# Patient Record
Sex: MALE | URBAN - METROPOLITAN AREA
[De-identification: names, ages, dates, MRNs, and addresses within clinical notes are randomized per-mention and may not be internally consistent; named-entity substitution may affect disease eponyms.]

---

## 2019-08-05 ENCOUNTER — APPOINTMENT (RX ONLY)
Dept: URBAN - METROPOLITAN AREA OTHER 13 | Facility: OTHER | Age: 79
Setting detail: DERMATOLOGY
End: 2019-08-05

## 2019-08-05 DIAGNOSIS — L57.0 ACTINIC KERATOSIS: ICD-10-CM

## 2019-08-05 DIAGNOSIS — D18.0 HEMANGIOMA: ICD-10-CM

## 2019-08-05 DIAGNOSIS — L98429 CHRONIC ULCER OF OTHER SPECIFIED SITES: ICD-10-CM

## 2019-08-05 DIAGNOSIS — L82.1 OTHER SEBORRHEIC KERATOSIS: ICD-10-CM

## 2019-08-05 DIAGNOSIS — D69.2 OTHER NONTHROMBOCYTOPENIC PURPURA: ICD-10-CM

## 2019-08-05 DIAGNOSIS — L98419 CHRONIC ULCER OF OTHER SPECIFIED SITES: ICD-10-CM

## 2019-08-05 DIAGNOSIS — Z71.89 OTHER SPECIFIED COUNSELING: ICD-10-CM

## 2019-08-05 DIAGNOSIS — L20.89 OTHER ATOPIC DERMATITIS: ICD-10-CM

## 2019-08-05 PROBLEM — D18.01 HEMANGIOMA OF SKIN AND SUBCUTANEOUS TISSUE: Status: ACTIVE | Noted: 2019-08-05

## 2019-08-05 PROBLEM — L98.419 NON-PRESSURE CHRONIC ULCER OF BUTTOCK WITH UNSPECIFIED SEVERITY: Status: ACTIVE | Noted: 2019-08-05

## 2019-08-05 PROCEDURE — ? RECOMMENDATIONS

## 2019-08-05 PROCEDURE — ? TREATMENT REGIMEN

## 2019-08-05 PROCEDURE — ? PRESCRIPTION

## 2019-08-05 PROCEDURE — 99203 OFFICE O/P NEW LOW 30 MIN: CPT | Mod: 25

## 2019-08-05 PROCEDURE — ? SUNSCREEN RECOMMENDATIONS

## 2019-08-05 PROCEDURE — 17000 DESTRUCT PREMALG LESION: CPT

## 2019-08-05 PROCEDURE — ? COUNSELING

## 2019-08-05 PROCEDURE — ? LIQUID NITROGEN

## 2019-08-05 RX ORDER — TRIAMCINOLONE ACETONIDE 1 MG/G
CREAM TOPICAL
Qty: 1 | Refills: 1 | Status: ERX | COMMUNITY
Start: 2019-08-05

## 2019-08-05 RX ADMIN — TRIAMCINOLONE ACETONIDE: 1 CREAM TOPICAL at 00:00

## 2019-08-05 ASSESSMENT — LOCATION DETAILED DESCRIPTION DERM
LOCATION DETAILED: RIGHT INFERIOR FOREHEAD
LOCATION DETAILED: EPIGASTRIC SKIN
LOCATION DETAILED: RIGHT INFERIOR MEDIAL UPPER BACK
LOCATION DETAILED: GLUTEAL CLEFT
LOCATION DETAILED: RIGHT PROXIMAL PRETIBIAL REGION
LOCATION DETAILED: LEFT DISTAL DORSAL FOREARM
LOCATION DETAILED: RIGHT MEDIAL UPPER BACK
LOCATION DETAILED: LEFT MEDIAL MALAR CHEEK
LOCATION DETAILED: LEFT PROXIMAL PRETIBIAL REGION
LOCATION DETAILED: LEFT DISTAL PRETIBIAL REGION
LOCATION DETAILED: RIGHT LATERAL ZYGOMA
LOCATION DETAILED: RIGHT DISTAL RADIAL DORSAL FOREARM
LOCATION DETAILED: LEFT FOREHEAD

## 2019-08-05 ASSESSMENT — LOCATION SIMPLE DESCRIPTION DERM
LOCATION SIMPLE: GLUTEAL CLEFT
LOCATION SIMPLE: LEFT PRETIBIAL REGION
LOCATION SIMPLE: RIGHT UPPER BACK
LOCATION SIMPLE: RIGHT FOREHEAD
LOCATION SIMPLE: RIGHT PRETIBIAL REGION
LOCATION SIMPLE: RIGHT ZYGOMA
LOCATION SIMPLE: LEFT FOREARM
LOCATION SIMPLE: LEFT CHEEK
LOCATION SIMPLE: LEFT FOREHEAD
LOCATION SIMPLE: RIGHT FOREARM
LOCATION SIMPLE: ABDOMEN

## 2019-08-05 ASSESSMENT — LOCATION ZONE DERM
LOCATION ZONE: FACE
LOCATION ZONE: TRUNK
LOCATION ZONE: LEG
LOCATION ZONE: ARM

## 2019-08-05 NOTE — HPI: RASH
What Type Of Note Output Would You Prefer (Optional)?: Standard Output
How Severe Is Your Rash?: mild
Is This A New Presentation, Or A Follow-Up?: Rash
How Severe Is Your Rash?: moderate
Additional History: Pilonidol cyst removed when patient was 26 years old

## 2019-08-05 NOTE — PROCEDURE: TREATMENT REGIMEN
Samples Given: Bensal HP to affected area on buttocks twice a day
Detail Level: Simple
Initiate Treatment: Triamcinolone to affected area once a day X2-3 days, repeat PRN

## 2019-08-05 NOTE — PROCEDURE: LIQUID NITROGEN
Duration Of Freeze Thaw-Cycle (Seconds): 5
Number Of Freeze-Thaw Cycles: 1 freeze-thaw cycle
Render Note In Bullet Format When Appropriate: No
Detail Level: Simple

## 2019-09-18 ENCOUNTER — APPOINTMENT (RX ONLY)
Dept: URBAN - METROPOLITAN AREA OTHER 13 | Facility: OTHER | Age: 79
Setting detail: DERMATOLOGY
End: 2019-09-18

## 2019-09-18 DIAGNOSIS — L98429 CHRONIC ULCER OF OTHER SPECIFIED SITES: ICD-10-CM

## 2019-09-18 DIAGNOSIS — L98419 CHRONIC ULCER OF OTHER SPECIFIED SITES: ICD-10-CM

## 2019-09-18 PROBLEM — L98.419 NON-PRESSURE CHRONIC ULCER OF BUTTOCK WITH UNSPECIFIED SEVERITY: Status: ACTIVE | Noted: 2019-09-18

## 2019-09-18 PROCEDURE — 99212 OFFICE O/P EST SF 10 MIN: CPT

## 2019-09-18 PROCEDURE — ? COUNSELING

## 2019-09-18 PROCEDURE — ? TREATMENT REGIMEN

## 2019-09-18 ASSESSMENT — LOCATION DETAILED DESCRIPTION DERM: LOCATION DETAILED: GLUTEAL CLEFT

## 2019-09-18 ASSESSMENT — LOCATION SIMPLE DESCRIPTION DERM: LOCATION SIMPLE: GLUTEAL CLEFT

## 2019-09-18 ASSESSMENT — LOCATION ZONE DERM: LOCATION ZONE: TRUNK

## 2019-09-18 NOTE — PROCEDURE: TREATMENT REGIMEN
Detail Level: Simple
Modify Regimen: Triamcinolone once a day X3 weeks, then discontinue
Samples Given: Bensal HP to affected area once a day

## 2019-10-30 ENCOUNTER — APPOINTMENT (RX ONLY)
Dept: URBAN - METROPOLITAN AREA OTHER 13 | Facility: OTHER | Age: 79
Setting detail: DERMATOLOGY
End: 2019-10-30

## 2019-10-30 DIAGNOSIS — D69.2 OTHER NONTHROMBOCYTOPENIC PURPURA: ICD-10-CM

## 2019-10-30 DIAGNOSIS — Z71.89 OTHER SPECIFIED COUNSELING: ICD-10-CM

## 2019-10-30 DIAGNOSIS — L98429 CHRONIC ULCER OF OTHER SPECIFIED SITES: ICD-10-CM | Status: STABLE

## 2019-10-30 DIAGNOSIS — L98419 CHRONIC ULCER OF OTHER SPECIFIED SITES: ICD-10-CM | Status: STABLE

## 2019-10-30 PROBLEM — L98.429 NON-PRESSURE CHRONIC ULCER OF BACK WITH UNSPECIFIED SEVERITY: Status: ACTIVE | Noted: 2019-10-30

## 2019-10-30 PROCEDURE — 99213 OFFICE O/P EST LOW 20 MIN: CPT

## 2019-10-30 PROCEDURE — ? TREATMENT REGIMEN

## 2019-10-30 PROCEDURE — ? COUNSELING

## 2019-10-30 ASSESSMENT — LOCATION DETAILED DESCRIPTION DERM
LOCATION DETAILED: LEFT DISTAL DORSAL FOREARM
LOCATION DETAILED: RIGHT DISTAL DORSAL FOREARM
LOCATION DETAILED: INFERIOR LUMBAR SPINE

## 2019-10-30 ASSESSMENT — LOCATION SIMPLE DESCRIPTION DERM
LOCATION SIMPLE: RIGHT FOREARM
LOCATION SIMPLE: LOWER BACK
LOCATION SIMPLE: LEFT FOREARM

## 2019-10-30 ASSESSMENT — LOCATION ZONE DERM
LOCATION ZONE: ARM
LOCATION ZONE: TRUNK

## 2019-10-30 NOTE — PROCEDURE: TREATMENT REGIMEN
Initiate Treatment: Triple Paste and get a donut pillow to sit on
Detail Level: Simple
Initiate Treatment: Dermend

## 2019-12-02 ENCOUNTER — APPOINTMENT (RX ONLY)
Dept: URBAN - METROPOLITAN AREA OTHER 13 | Facility: OTHER | Age: 79
Setting detail: DERMATOLOGY
End: 2019-12-02

## 2019-12-02 DIAGNOSIS — D69.2 OTHER NONTHROMBOCYTOPENIC PURPURA: ICD-10-CM

## 2019-12-02 DIAGNOSIS — L98429 CHRONIC ULCER OF OTHER SPECIFIED SITES: ICD-10-CM | Status: UNCHANGED

## 2019-12-02 DIAGNOSIS — Z71.89 OTHER SPECIFIED COUNSELING: ICD-10-CM

## 2019-12-02 DIAGNOSIS — L98419 CHRONIC ULCER OF OTHER SPECIFIED SITES: ICD-10-CM | Status: UNCHANGED

## 2019-12-02 DIAGNOSIS — L20.89 OTHER ATOPIC DERMATITIS: ICD-10-CM

## 2019-12-02 PROBLEM — L98.429 NON-PRESSURE CHRONIC ULCER OF BACK WITH UNSPECIFIED SEVERITY: Status: ACTIVE | Noted: 2019-12-02

## 2019-12-02 PROBLEM — L20.84 INTRINSIC (ALLERGIC) ECZEMA: Status: ACTIVE | Noted: 2019-12-02

## 2019-12-02 PROCEDURE — ? COUNSELING

## 2019-12-02 PROCEDURE — ? TREATMENT REGIMEN

## 2019-12-02 PROCEDURE — ? PRESCRIPTION

## 2019-12-02 PROCEDURE — 99213 OFFICE O/P EST LOW 20 MIN: CPT

## 2019-12-02 RX ORDER — MUPIROCIN 20 MG/G
OINTMENT TOPICAL
Qty: 1 | Refills: 2 | Status: ERX | COMMUNITY
Start: 2019-12-02

## 2019-12-02 RX ADMIN — MUPIROCIN: 20 OINTMENT TOPICAL at 00:00

## 2019-12-02 ASSESSMENT — LOCATION ZONE DERM
LOCATION ZONE: ARM
LOCATION ZONE: TRUNK

## 2019-12-02 ASSESSMENT — LOCATION SIMPLE DESCRIPTION DERM
LOCATION SIMPLE: LOWER BACK
LOCATION SIMPLE: RIGHT FOREARM
LOCATION SIMPLE: RIGHT UPPER ARM
LOCATION SIMPLE: LEFT FOREARM

## 2019-12-02 ASSESSMENT — LOCATION DETAILED DESCRIPTION DERM
LOCATION DETAILED: RIGHT ANTERIOR DISTAL UPPER ARM
LOCATION DETAILED: INFERIOR LUMBAR SPINE
LOCATION DETAILED: LEFT DISTAL DORSAL FOREARM
LOCATION DETAILED: RIGHT DISTAL DORSAL FOREARM

## 2019-12-02 NOTE — PROCEDURE: TREATMENT REGIMEN
Initiate Treatment: Triamcinolone, Bensal HP, and Mupirocin Ointment
Detail Level: Simple
Initiate Treatment: Triamcinolone
Initiate Treatment: Dermend

## 2020-04-23 RX ORDER — TRIAMCINOLONE ACETONIDE 1 MG/G
CREAM TOPICAL
Qty: 1 | Refills: 1 | Status: ERX

## 2020-05-18 ENCOUNTER — APPOINTMENT (RX ONLY)
Dept: URBAN - METROPOLITAN AREA OTHER 13 | Facility: OTHER | Age: 80
Setting detail: DERMATOLOGY
End: 2020-05-18

## 2020-05-18 DIAGNOSIS — L20.89 OTHER ATOPIC DERMATITIS: ICD-10-CM | Status: STABLE

## 2020-05-18 DIAGNOSIS — L82.1 OTHER SEBORRHEIC KERATOSIS: ICD-10-CM

## 2020-05-18 DIAGNOSIS — L98419 CHRONIC ULCER OF OTHER SPECIFIED SITES: ICD-10-CM | Status: WELL CONTROLLED

## 2020-05-18 DIAGNOSIS — D18.0 HEMANGIOMA: ICD-10-CM

## 2020-05-18 DIAGNOSIS — Z71.89 OTHER SPECIFIED COUNSELING: ICD-10-CM

## 2020-05-18 DIAGNOSIS — L81.4 OTHER MELANIN HYPERPIGMENTATION: ICD-10-CM

## 2020-05-18 DIAGNOSIS — D69.2 OTHER NONTHROMBOCYTOPENIC PURPURA: ICD-10-CM

## 2020-05-18 DIAGNOSIS — L57.0 ACTINIC KERATOSIS: ICD-10-CM

## 2020-05-18 DIAGNOSIS — L98429 CHRONIC ULCER OF OTHER SPECIFIED SITES: ICD-10-CM | Status: WELL CONTROLLED

## 2020-05-18 PROBLEM — L20.84 INTRINSIC (ALLERGIC) ECZEMA: Status: ACTIVE | Noted: 2020-05-18

## 2020-05-18 PROBLEM — L98.429 NON-PRESSURE CHRONIC ULCER OF BACK WITH UNSPECIFIED SEVERITY: Status: ACTIVE | Noted: 2020-05-18

## 2020-05-18 PROBLEM — D18.01 HEMANGIOMA OF SKIN AND SUBCUTANEOUS TISSUE: Status: ACTIVE | Noted: 2020-05-18

## 2020-05-18 PROCEDURE — ? SUNSCREEN RECOMMENDATIONS

## 2020-05-18 PROCEDURE — 17003 DESTRUCT PREMALG LES 2-14: CPT

## 2020-05-18 PROCEDURE — ? COUNSELING

## 2020-05-18 PROCEDURE — ? TREATMENT REGIMEN

## 2020-05-18 PROCEDURE — ? LIQUID NITROGEN

## 2020-05-18 PROCEDURE — 17000 DESTRUCT PREMALG LESION: CPT

## 2020-05-18 PROCEDURE — 99214 OFFICE O/P EST MOD 30 MIN: CPT | Mod: 25

## 2020-05-18 ASSESSMENT — LOCATION ZONE DERM
LOCATION ZONE: TRUNK
LOCATION ZONE: ARM
LOCATION ZONE: FACE
LOCATION ZONE: LEG
LOCATION ZONE: HAND
LOCATION ZONE: LIP

## 2020-05-18 ASSESSMENT — LOCATION SIMPLE DESCRIPTION DERM
LOCATION SIMPLE: LEFT FOREARM
LOCATION SIMPLE: LEFT FOREHEAD
LOCATION SIMPLE: LOWER BACK
LOCATION SIMPLE: RIGHT UPPER BACK
LOCATION SIMPLE: RIGHT HAND
LOCATION SIMPLE: RIGHT UPPER ARM
LOCATION SIMPLE: RIGHT FOREHEAD
LOCATION SIMPLE: RIGHT FOREARM
LOCATION SIMPLE: LEFT HAND
LOCATION SIMPLE: LEFT ZYGOMA
LOCATION SIMPLE: LEFT THIGH
LOCATION SIMPLE: ABDOMEN
LOCATION SIMPLE: LEFT PRETIBIAL REGION
LOCATION SIMPLE: RIGHT PRETIBIAL REGION
LOCATION SIMPLE: UPPER LIP

## 2020-05-18 ASSESSMENT — LOCATION DETAILED DESCRIPTION DERM
LOCATION DETAILED: RIGHT DISTAL DORSAL FOREARM
LOCATION DETAILED: INFERIOR LUMBAR SPINE
LOCATION DETAILED: LEFT ANTERIOR LATERAL PROXIMAL THIGH
LOCATION DETAILED: RIGHT RADIAL DORSAL HAND
LOCATION DETAILED: LEFT ULNAR DORSAL HAND
LOCATION DETAILED: LEFT FOREHEAD
LOCATION DETAILED: LEFT MEDIAL ZYGOMA
LOCATION DETAILED: RIGHT PROXIMAL PRETIBIAL REGION
LOCATION DETAILED: LEFT PROXIMAL PRETIBIAL REGION
LOCATION DETAILED: RIGHT DISTAL RADIAL DORSAL FOREARM
LOCATION DETAILED: PHILTRUM
LOCATION DETAILED: RIGHT ANTERIOR DISTAL UPPER ARM
LOCATION DETAILED: RIGHT INFERIOR MEDIAL UPPER BACK
LOCATION DETAILED: RIGHT MEDIAL UPPER BACK
LOCATION DETAILED: LEFT DISTAL DORSAL FOREARM
LOCATION DETAILED: RIGHT INFERIOR FOREHEAD
LOCATION DETAILED: EPIGASTRIC SKIN

## 2020-07-27 ENCOUNTER — APPOINTMENT (RX ONLY)
Dept: URBAN - METROPOLITAN AREA OTHER 13 | Facility: OTHER | Age: 80
Setting detail: DERMATOLOGY
End: 2020-07-27

## 2020-07-27 DIAGNOSIS — L82.1 OTHER SEBORRHEIC KERATOSIS: ICD-10-CM

## 2020-07-27 DIAGNOSIS — D69.2 OTHER NONTHROMBOCYTOPENIC PURPURA: ICD-10-CM

## 2020-07-27 DIAGNOSIS — L21.8 OTHER SEBORRHEIC DERMATITIS: ICD-10-CM | Status: INADEQUATELY CONTROLLED

## 2020-07-27 DIAGNOSIS — L81.4 OTHER MELANIN HYPERPIGMENTATION: ICD-10-CM

## 2020-07-27 DIAGNOSIS — L72.8 OTHER FOLLICULAR CYSTS OF THE SKIN AND SUBCUTANEOUS TISSUE: ICD-10-CM

## 2020-07-27 DIAGNOSIS — L57.0 ACTINIC KERATOSIS: ICD-10-CM

## 2020-07-27 PROCEDURE — ? TREATMENT REGIMEN

## 2020-07-27 PROCEDURE — ? LIQUID NITROGEN

## 2020-07-27 PROCEDURE — 17000 DESTRUCT PREMALG LESION: CPT

## 2020-07-27 PROCEDURE — ? COUNSELING

## 2020-07-27 PROCEDURE — 17003 DESTRUCT PREMALG LES 2-14: CPT

## 2020-07-27 PROCEDURE — ? PRESCRIPTION

## 2020-07-27 PROCEDURE — 99214 OFFICE O/P EST MOD 30 MIN: CPT | Mod: 25

## 2020-07-27 RX ORDER — CLOBETASOL PROPIONATE 0.5 MG/ML
SOLUTION TOPICAL
Qty: 1 | Refills: 1 | Status: ERX | COMMUNITY
Start: 2020-07-27

## 2020-07-27 RX ORDER — KETOCONAZOLE 20 MG/ML
SHAMPOO, SUSPENSION TOPICAL BIW
Qty: 1 | Refills: 6 | Status: ERX | COMMUNITY
Start: 2020-07-27

## 2020-07-27 RX ADMIN — CLOBETASOL PROPIONATE: 0.5 SOLUTION TOPICAL at 00:00

## 2020-07-27 RX ADMIN — KETOCONAZOLE: 20 SHAMPOO, SUSPENSION TOPICAL at 00:00

## 2020-07-27 ASSESSMENT — LOCATION DETAILED DESCRIPTION DERM
LOCATION DETAILED: RIGHT INFERIOR LATERAL FOREHEAD
LOCATION DETAILED: LEFT MEDIAL FRONTAL SCALP
LOCATION DETAILED: RIGHT PROXIMAL POSTERIOR UPPER ARM
LOCATION DETAILED: LEFT UPPER CUTANEOUS LIP
LOCATION DETAILED: LEFT ANTERIOR PROXIMAL THIGH
LOCATION DETAILED: RIGHT CENTRAL FRONTAL SCALP
LOCATION DETAILED: RIGHT SUPERIOR FOREHEAD
LOCATION DETAILED: LEFT CENTRAL FRONTAL SCALP
LOCATION DETAILED: RIGHT DISTAL DORSAL FOREARM
LOCATION DETAILED: LEFT DISTAL DORSAL FOREARM

## 2020-07-27 ASSESSMENT — LOCATION SIMPLE DESCRIPTION DERM
LOCATION SIMPLE: LEFT FOREARM
LOCATION SIMPLE: LEFT LIP
LOCATION SIMPLE: RIGHT UPPER ARM
LOCATION SIMPLE: LEFT SCALP
LOCATION SIMPLE: RIGHT FOREHEAD
LOCATION SIMPLE: RIGHT FOREARM
LOCATION SIMPLE: SCALP
LOCATION SIMPLE: LEFT THIGH

## 2020-07-27 ASSESSMENT — LOCATION ZONE DERM
LOCATION ZONE: SCALP
LOCATION ZONE: FACE
LOCATION ZONE: LEG
LOCATION ZONE: ARM
LOCATION ZONE: LIP

## 2020-07-27 NOTE — PROCEDURE: LIQUID NITROGEN
Number Of Freeze-Thaw Cycles: 1 freeze-thaw cycle
Duration Of Freeze Thaw-Cycle (Seconds): 5
Detail Level: Simple
Render Note In Bullet Format When Appropriate: No

## 2021-05-17 ENCOUNTER — APPOINTMENT (RX ONLY)
Dept: URBAN - METROPOLITAN AREA OTHER 13 | Facility: OTHER | Age: 81
Setting detail: DERMATOLOGY
End: 2021-05-17

## 2021-05-17 DIAGNOSIS — L82.1 OTHER SEBORRHEIC KERATOSIS: ICD-10-CM

## 2021-05-17 DIAGNOSIS — L81.4 OTHER MELANIN HYPERPIGMENTATION: ICD-10-CM

## 2021-05-17 DIAGNOSIS — D69.2 OTHER NONTHROMBOCYTOPENIC PURPURA: ICD-10-CM

## 2021-05-17 DIAGNOSIS — L21.8 OTHER SEBORRHEIC DERMATITIS: ICD-10-CM

## 2021-05-17 DIAGNOSIS — D18.0 HEMANGIOMA: ICD-10-CM

## 2021-05-17 DIAGNOSIS — Z71.89 OTHER SPECIFIED COUNSELING: ICD-10-CM

## 2021-05-17 PROBLEM — D18.01 HEMANGIOMA OF SKIN AND SUBCUTANEOUS TISSUE: Status: ACTIVE | Noted: 2021-05-17

## 2021-05-17 PROCEDURE — 99213 OFFICE O/P EST LOW 20 MIN: CPT

## 2021-05-17 PROCEDURE — ? COUNSELING

## 2021-05-17 PROCEDURE — ? TREATMENT REGIMEN

## 2021-05-17 PROCEDURE — ? PRESCRIPTION

## 2021-05-17 RX ORDER — KETOCONAZOLE 20 MG/G
CREAM TOPICAL
Qty: 1 | Refills: 1 | Status: ERX | COMMUNITY
Start: 2021-05-17

## 2021-05-17 RX ADMIN — KETOCONAZOLE: 20 CREAM TOPICAL at 00:00

## 2021-05-17 ASSESSMENT — LOCATION DETAILED DESCRIPTION DERM
LOCATION DETAILED: RIGHT DISTAL DORSAL FOREARM
LOCATION DETAILED: LEFT DISTAL DORSAL FOREARM
LOCATION DETAILED: RIGHT CENTRAL FRONTAL SCALP
LOCATION DETAILED: LEFT MEDIAL MALAR CHEEK
LOCATION DETAILED: INFERIOR MID FOREHEAD
LOCATION DETAILED: LEFT CENTRAL FRONTAL SCALP
LOCATION DETAILED: RIGHT MEDIAL MALAR CHEEK
LOCATION DETAILED: RIGHT PROXIMAL POSTERIOR UPPER ARM
LOCATION DETAILED: LEFT PROXIMAL POSTERIOR UPPER ARM
LOCATION DETAILED: RIGHT INFERIOR MEDIAL UPPER BACK
LOCATION DETAILED: RIGHT MEDIAL UPPER BACK
LOCATION DETAILED: RIGHT SUPERIOR MEDIAL UPPER BACK
LOCATION DETAILED: LEFT UPPER CUTANEOUS LIP

## 2021-05-17 ASSESSMENT — LOCATION SIMPLE DESCRIPTION DERM
LOCATION SIMPLE: RIGHT UPPER ARM
LOCATION SIMPLE: LEFT CHEEK
LOCATION SIMPLE: INFERIOR FOREHEAD
LOCATION SIMPLE: RIGHT FOREARM
LOCATION SIMPLE: LEFT FOREARM
LOCATION SIMPLE: RIGHT CHEEK
LOCATION SIMPLE: LEFT LIP
LOCATION SIMPLE: SCALP
LOCATION SIMPLE: LEFT UPPER ARM
LOCATION SIMPLE: RIGHT UPPER BACK

## 2021-05-17 ASSESSMENT — LOCATION ZONE DERM
LOCATION ZONE: TRUNK
LOCATION ZONE: LIP
LOCATION ZONE: ARM
LOCATION ZONE: SCALP
LOCATION ZONE: FACE

## 2021-05-17 NOTE — PROCEDURE: TREATMENT REGIMEN
Detail Level: Simple
Initiate Treatment: Ketoconazole 2% cream bid x 6 weeks, prn flares \\n\\nSevere flares Triamcinolone QD x 7 days, then stop
Otc Regimen: Gold Bond Eczema lotion as needed

## 2022-05-23 ENCOUNTER — APPOINTMENT (RX ONLY)
Dept: URBAN - METROPOLITAN AREA OTHER 13 | Facility: OTHER | Age: 82
Setting detail: DERMATOLOGY
End: 2022-05-23

## 2022-05-23 DIAGNOSIS — D18.0 HEMANGIOMA: ICD-10-CM

## 2022-05-23 DIAGNOSIS — R60.0 LOCALIZED EDEMA: ICD-10-CM

## 2022-05-23 DIAGNOSIS — L82.1 OTHER SEBORRHEIC KERATOSIS: ICD-10-CM

## 2022-05-23 DIAGNOSIS — Z71.89 OTHER SPECIFIED COUNSELING: ICD-10-CM

## 2022-05-23 DIAGNOSIS — L57.0 ACTINIC KERATOSIS: ICD-10-CM

## 2022-05-23 DIAGNOSIS — L85.3 XEROSIS CUTIS: ICD-10-CM

## 2022-05-23 DIAGNOSIS — L21.8 OTHER SEBORRHEIC DERMATITIS: ICD-10-CM | Status: WELL CONTROLLED

## 2022-05-23 DIAGNOSIS — D69.2 OTHER NONTHROMBOCYTOPENIC PURPURA: ICD-10-CM

## 2022-05-23 DIAGNOSIS — L81.4 OTHER MELANIN HYPERPIGMENTATION: ICD-10-CM

## 2022-05-23 PROBLEM — D18.01 HEMANGIOMA OF SKIN AND SUBCUTANEOUS TISSUE: Status: ACTIVE | Noted: 2022-05-23

## 2022-05-23 PROCEDURE — ? COUNSELING

## 2022-05-23 PROCEDURE — 17000 DESTRUCT PREMALG LESION: CPT

## 2022-05-23 PROCEDURE — ? LIQUID NITROGEN

## 2022-05-23 PROCEDURE — 17003 DESTRUCT PREMALG LES 2-14: CPT

## 2022-05-23 PROCEDURE — 99213 OFFICE O/P EST LOW 20 MIN: CPT | Mod: 25

## 2022-05-23 PROCEDURE — ? TREATMENT REGIMEN

## 2022-05-23 ASSESSMENT — LOCATION DETAILED DESCRIPTION DERM
LOCATION DETAILED: RIGHT CENTRAL FRONTAL SCALP
LOCATION DETAILED: RIGHT DISTAL DORSAL FOREARM
LOCATION DETAILED: LEFT CENTRAL TEMPLE
LOCATION DETAILED: RIGHT MEDIAL MALAR CHEEK
LOCATION DETAILED: LEFT DISTAL PRETIBIAL REGION
LOCATION DETAILED: INFERIOR MID FOREHEAD
LOCATION DETAILED: LEFT MEDIAL SUPERIOR CHEST
LOCATION DETAILED: LEFT PROXIMAL POSTERIOR UPPER ARM
LOCATION DETAILED: RIGHT INFERIOR MEDIAL UPPER BACK
LOCATION DETAILED: RIGHT MEDIAL UPPER BACK
LOCATION DETAILED: LEFT MEDIAL MALAR CHEEK
LOCATION DETAILED: RIGHT DISTAL PRETIBIAL REGION
LOCATION DETAILED: LEFT DISTAL DORSAL FOREARM
LOCATION DETAILED: RIGHT LATERAL DISTAL PRETIBIAL REGION
LOCATION DETAILED: RIGHT SUPERIOR MEDIAL UPPER BACK
LOCATION DETAILED: LEFT CENTRAL FRONTAL SCALP
LOCATION DETAILED: LEFT UPPER CUTANEOUS LIP
LOCATION DETAILED: RIGHT PROXIMAL POSTERIOR UPPER ARM

## 2022-05-23 ASSESSMENT — LOCATION ZONE DERM
LOCATION ZONE: LEG
LOCATION ZONE: SCALP
LOCATION ZONE: TRUNK
LOCATION ZONE: LIP
LOCATION ZONE: ARM
LOCATION ZONE: FACE

## 2022-05-23 ASSESSMENT — LOCATION SIMPLE DESCRIPTION DERM
LOCATION SIMPLE: RIGHT UPPER BACK
LOCATION SIMPLE: LEFT FOREARM
LOCATION SIMPLE: LEFT PRETIBIAL REGION
LOCATION SIMPLE: RIGHT FOREARM
LOCATION SIMPLE: LEFT UPPER ARM
LOCATION SIMPLE: RIGHT PRETIBIAL REGION
LOCATION SIMPLE: INFERIOR FOREHEAD
LOCATION SIMPLE: LEFT LIP
LOCATION SIMPLE: LEFT TEMPLE
LOCATION SIMPLE: RIGHT UPPER ARM
LOCATION SIMPLE: CHEST
LOCATION SIMPLE: RIGHT CHEEK
LOCATION SIMPLE: LEFT CHEEK
LOCATION SIMPLE: SCALP

## 2022-05-23 NOTE — PROCEDURE: LIQUID NITROGEN
Detail Level: Detailed
Duration Of Freeze Thaw-Cycle (Seconds): 3
Number Of Freeze-Thaw Cycles: 1 freeze-thaw cycle
Render Note In Bullet Format When Appropriate: No
Application Tool (Optional): Liquid Nitrogen Sprayer
Show Applicator Variable?: Yes

## 2022-05-23 NOTE — PROCEDURE: REASSURANCE
Hide Additional Notes?: No
Detail Level: Zone
Additional Notes (Optional): Advised patient to follow up with GP and request heart and kidney testing.

## 2022-05-23 NOTE — PROCEDURE: TREATMENT REGIMEN
Detail Level: Simple
Initiate Treatment: Ketoconazole 2% cream bid x 6 weeks, prn flares
Otc Regimen: Gold Bond Eczema lotion as needed
Initiate Treatment: Amlactin daily

## 2022-07-27 ENCOUNTER — APPOINTMENT (RX ONLY)
Dept: URBAN - METROPOLITAN AREA OTHER 13 | Facility: OTHER | Age: 82
Setting detail: DERMATOLOGY
End: 2022-07-27

## 2022-07-27 ENCOUNTER — RX ONLY (OUTPATIENT)
Age: 82
Setting detail: RX ONLY
End: 2022-07-27

## 2022-07-27 DIAGNOSIS — L81.8 OTHER SPECIFIED DISORDERS OF PIGMENTATION: ICD-10-CM

## 2022-07-27 DIAGNOSIS — S1012XA BLISTER OF FACE, NECK, AND SCALP EXCEPT EYE, WITHOUT MENTION OF INFECTION: ICD-10-CM

## 2022-07-27 DIAGNOSIS — S00429A BLISTER OF FACE, NECK, AND SCALP EXCEPT EYE, WITHOUT MENTION OF INFECTION: ICD-10-CM

## 2022-07-27 DIAGNOSIS — S0032XA BLISTER OF FACE, NECK, AND SCALP EXCEPT EYE, WITHOUT MENTION OF INFECTION: ICD-10-CM

## 2022-07-27 DIAGNOSIS — S00521A BLISTER OF FACE, NECK, AND SCALP EXCEPT EYE, WITHOUT MENTION OF INFECTION: ICD-10-CM

## 2022-07-27 DIAGNOSIS — S0092XA BLISTER OF FACE, NECK, AND SCALP EXCEPT EYE, WITHOUT MENTION OF INFECTION: ICD-10-CM

## 2022-07-27 DIAGNOSIS — S0002XA BLISTER OF FACE, NECK, AND SCALP EXCEPT EYE, WITHOUT MENTION OF INFECTION: ICD-10-CM

## 2022-07-27 DIAGNOSIS — S1092XA BLISTER OF FACE, NECK, AND SCALP EXCEPT EYE, WITHOUT MENTION OF INFECTION: ICD-10-CM

## 2022-07-27 DIAGNOSIS — S00522A BLISTER OF FACE, NECK, AND SCALP EXCEPT EYE, WITHOUT MENTION OF INFECTION: ICD-10-CM

## 2022-07-27 PROBLEM — S80.821A BLISTER (NONTHERMAL), RIGHT LOWER LEG, INITIAL ENCOUNTER: Status: ACTIVE | Noted: 2022-07-27

## 2022-07-27 PROCEDURE — 99213 OFFICE O/P EST LOW 20 MIN: CPT

## 2022-07-27 PROCEDURE — ? COUNSELING

## 2022-07-27 PROCEDURE — ? PHOTO-DOCUMENTATION

## 2022-07-27 PROCEDURE — ? PRESCRIPTION MEDICATION MANAGEMENT

## 2022-07-27 RX ORDER — TRIAMCINOLONE ACETONIDE 1 MG/G
CREAM TOPICAL
Qty: 453.6 | Refills: 0 | Status: ERX | COMMUNITY
Start: 2022-07-27

## 2022-07-27 ASSESSMENT — LOCATION DETAILED DESCRIPTION DERM
LOCATION DETAILED: RIGHT DISTAL PRETIBIAL REGION
LOCATION DETAILED: RIGHT PROXIMAL PRETIBIAL REGION

## 2022-07-27 ASSESSMENT — LOCATION ZONE DERM: LOCATION ZONE: LEG

## 2022-07-27 ASSESSMENT — LOCATION SIMPLE DESCRIPTION DERM: LOCATION SIMPLE: RIGHT PRETIBIAL REGION

## 2022-07-27 NOTE — PROCEDURE: PRESCRIPTION MEDICATION MANAGEMENT
Plan: May consider biopsy if condition fails to improve by next visit.
Render In Strict Bullet Format?: No
Initiate Treatment: Patient to use Triamcinolone twice daily for up to 3-4 weeks, wait a week before restarting prn flares
Detail Level: Zone

## 2024-12-28 NOTE — PROCEDURE: RECOMMENDATIONS
Recommendation Preamble: The following recommendations were made during the visit: dermend otc, quirino vilchis
Detail Level: Zone
Carolin Sevilla)